# Patient Record
Sex: FEMALE | Race: WHITE
[De-identification: names, ages, dates, MRNs, and addresses within clinical notes are randomized per-mention and may not be internally consistent; named-entity substitution may affect disease eponyms.]

---
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
## 2025-01-28 NOTE — ASU PREOP CHECKLIST - HEIGHT IN FEET
[facility] [place] -- Progress Note    Subjective   NO complaints.   Current Facility-Administered Medications   Medication    pantoprazole (PROTONIX INJECT) injection 40 mg    ondansetron (ZOFRAN) injection 4 mg    morphine injection 4 mg    atorvastatin (LIPITOR) tablet 20 mg    cholecalciferol (VITAMIN D) 1.25 mg(50,000 units) capsule 1.25 mg    [Held by provider] hydrALAZINE (APRESOLINE) tablet 25 mg    levothyroxine (SYNTHROID, LEVOTHROID) tablet 88 mcg    [Held by provider] losartan (COZAAR) tablet 100 mg    sildenafil (REVATIO) tablet 20 mg    cefTRIAXone (ROCEPHIN) 2,000 mg in sterile water (preservative free) IV syringe    [Held by provider] furosemide (LASIX) tablet 40 mg    ALPRAZolam (XANAX) tablet 0.25 mg    metoPROLOL tartrate (LOPRESSOR) tablet 50 mg    acetaminophen (TYLENOL) tablet 650 mg     Current Outpatient Medications   Medication Sig Dispense Refill    levothyroxine 88 MCG tablet Take 88 mcg by mouth daily.      atorvastatin (LIPITOR) 20 MG tablet Take 20 mg by mouth daily.      sildenafil (REVATIO) 20 MG tablet Take 20 mg by mouth in the morning and 20 mg at noon and 20 mg in the evening.      hydrALAZINE (APRESOLINE) 25 MG tablet Take 25 mg by mouth in the morning and 25 mg at noon and 25 mg in the evening.      apixaBAN (ELIQUIS) 5 MG Tab Take 5 mg by mouth every 12 hours.      ZINC SULFATE PO Take 1 tablet by mouth daily.      ergocalciferol (DRISDOL) 1.25 mg (50,000 units) capsule Take 1.25 mg by mouth 1 day a week.      acetaminophen (TYLENOL) 500 MG tablet Take 500 mg by mouth every 6 hours as needed for Pain.      ferrous sulfate 325 (65 FE) MG tablet Take 325 mg by mouth daily (with breakfast).      furosemide (LASIX) 40 MG tablet Take 40 mg by mouth every other day.      losartan (COZAAR) 100 MG tablet Take 100 mg by mouth daily.      metoPROLOL tartrate (LOPRESSOR) 25 MG tablet Take 25 mg by mouth in the morning and 25 mg in the evening.      docusate sodium-sennosides  (SENOKOT S) 50-8.6 MG per tablet Take 1 tablet by mouth in the morning and 1 tablet in the evening.      ALPRAZolam (XANAX) 0.25 MG tablet TAKE ONE TABLET BY MOUTH IF NEEDED FOR HYPERBARIC THERAPY TO AVOID CALUSTROPHOBIA      metoPROLOL succinate (TOPROL-XL) 25 MG 24 hr tablet Take 25 mg by mouth in the morning and 25 mg in the evening.      alendronate (FOSAMAX) 70 MG tablet Take 70 mg by mouth 1 day a week.         Prior to Admission medications    Medication Sig Start Date End Date Taking? Authorizing Provider   levothyroxine 88 MCG tablet Take 88 mcg by mouth daily.   Yes Provider, Outside   atorvastatin (LIPITOR) 20 MG tablet Take 20 mg by mouth daily.   Yes Provider, Outside   sildenafil (REVATIO) 20 MG tablet Take 20 mg by mouth in the morning and 20 mg at noon and 20 mg in the evening.   Yes Provider, Outside   hydrALAZINE (APRESOLINE) 25 MG tablet Take 25 mg by mouth in the morning and 25 mg at noon and 25 mg in the evening.   Yes Provider, Outside   apixaBAN (ELIQUIS) 5 MG Tab Take 5 mg by mouth every 12 hours.   Yes Provider, Outside   ZINC SULFATE PO Take 1 tablet by mouth daily.   Yes Provider, Outside   ergocalciferol (DRISDOL) 1.25 mg (50,000 units) capsule Take 1.25 mg by mouth 1 day a week.   Yes Provider, Outside   acetaminophen (TYLENOL) 500 MG tablet Take 500 mg by mouth every 6 hours as needed for Pain.   Yes Provider, Outside   ferrous sulfate 325 (65 FE) MG tablet Take 325 mg by mouth daily (with breakfast).   Yes Provider, Outside   furosemide (LASIX) 40 MG tablet Take 40 mg by mouth every other day.   Yes Provider, Outside   losartan (COZAAR) 100 MG tablet Take 100 mg by mouth daily.   Yes Provider, Outside   metoPROLOL tartrate (LOPRESSOR) 25 MG tablet Take 25 mg by mouth in the morning and 25 mg in the evening.   Yes Provider, Outside   docusate sodium-sennosides (SENOKOT S) 50-8.6 MG per tablet Take 1 tablet by mouth in the morning and 1 tablet in the evening.   Yes Provider, Outside    ALPRAZolam (XANAX) 0.25 MG tablet TAKE ONE TABLET BY MOUTH IF NEEDED FOR HYPERBARIC THERAPY TO AVOID CALUSTROPHOBIA 8/17/24  Yes Provider, Outside   metoPROLOL succinate (TOPROL-XL) 25 MG 24 hr tablet Take 25 mg by mouth in the morning and 25 mg in the evening.   Yes Provider, Outside   alendronate (FOSAMAX) 70 MG tablet Take 70 mg by mouth 1 day a week. 10/5/24  Yes Provider, Outside       Objective   Temp:  [97.9 °F (36.6 °C)-98.6 °F (37 °C)] 97.9 °F (36.6 °C)  Heart Rate:  [] 100  Resp:  [15-26] 20  BP: (104-133)/(44-73) 122/58  FiO2 (%):  [40 %] 40 %     Telemetry:a fib  General: Alert and oriented in no apparent distress.  HEENT: No focal deficits.  Neck: No JVD   Cardiac: Irregular rhythm, no murmur  Lungs: Clear to ausculation, no crackles   Abdomen: tender due to chronic epigastric. wound.   Extremities:  pedal pulses 0/4,  no edema.  Tender to palpation.   Neurologic: Alert and oriented, normal affect.  Skin: Warm and dry.   Psych: cooperative    Labs:  Cardiac Labs  No results found    Recent Results (from the past 24 hour(s))   Comprehensive Metabolic Panel    Collection Time: 01/27/25  8:41 AM    Specimen: Blood, Venous   Result Value Ref Range    Fasting Status      Sodium 140 135 - 145 mmol/L    Potassium 4.1 3.4 - 5.1 mmol/L    Chloride 109 97 - 110 mmol/L    Carbon Dioxide 27 21 - 32 mmol/L    Anion Gap 8 7 - 19 mmol/L    Glucose 83 70 - 99 mg/dL    BUN 24 (H) 6 - 20 mg/dL    Creatinine 0.84 0.51 - 0.95 mg/dL    Glomerular Filtration Rate 70 >=60    BUN/Cr 29 (H) 7 - 25    Calcium 8.5 8.4 - 10.2 mg/dL    Bilirubin, Total 1.2 (H) 0.2 - 1.0 mg/dL    GOT/AST 82 (H) <=37 Units/L    GPT/ (H) <64 Units/L    Alkaline Phosphatase 195 (H) 45 - 117 Units/L    Albumin 2.4 (L) 3.4 - 5.0 g/dL    Protein, Total 6.4 6.4 - 8.2 g/dL    Globulin 4.0 2.0 - 4.0 g/dL    A/G Ratio 0.6 (L) 1.0 - 2.4   CBC with Automated Differential (performable only)    Collection Time: 01/27/25  8:41 AM    Specimen: Blood,  Venous   Result Value Ref Range    WBC 9.3 4.2 - 11.0 K/mcL    RBC 2.51 (L) 4.00 - 5.20 mil/mcL    HGB 8.1 (L) 12.0 - 15.5 g/dL    HCT 26.6 (L) 36.0 - 46.5 %    .0 (H) 78.0 - 100.0 fl    MCH 32.3 26.0 - 34.0 pg    MCHC 30.5 (L) 32.0 - 36.5 g/dL    RDW-CV 14.5 11.0 - 15.0 %    RDW-SD 56.2 (H) 39.0 - 50.0 fL     140 - 450 K/mcL    NRBC 0 <=0 /100 WBC    Neutrophil, Percent 72 %    Lymphocytes, Percent 11 %    Mono, Percent 16 %    Eosinophils, Percent 0 %    Basophils, Percent 0 %    Immature Granulocytes 1 %    Absolute Neutrophils 6.7 1.8 - 7.7 K/mcL    Absolute Lymphocytes 1.0 1.0 - 4.0 K/mcL    Absolute Monocytes 1.5 (H) 0.3 - 0.9 K/mcL    Absolute Eosinophils  0.0 0.0 - 0.5 K/mcL    Absolute Basophils 0.0 0.0 - 0.3 K/mcL    Absolute Immature Granulocytes 0.1 0.0 - 0.2 K/mcL   GLUCOSE, BEDSIDE - POINT OF CARE    Collection Time: 01/27/25  8:50 AM    Specimen: Blood   Result Value Ref Range    GLUCOSE, BEDSIDE - POINT OF CARE 72 70 - 99 mg/dL   GLUCOSE, BEDSIDE - POINT OF CARE    Collection Time: 01/27/25 11:34 AM    Specimen: Blood   Result Value Ref Range    GLUCOSE, BEDSIDE - POINT OF CARE 73 70 - 99 mg/dL   TRANSTHORACIC ECHO (TTE) COMPLETE W/ W/O IMAGING AGENT    Collection Time: 01/27/25  2:22 PM   Result Value Ref Range    Ejection Fraction 58%     AV VTI (Previously displayed as [place]) 1.29     MV Peak E Velocity 0.38     MV Peak A Velocity 235     E Wave Decelaration Time [id number]     MV E Wave Galindo/E Tissue Galindo Med 8.49     MV E Tissue Galindo Med 10.8     Tricuspid Valve Peak Regurgitation Velocity 3     Ascending Aorta 8     TV Estimated Right Arterial Pressure 6.96     Tricuspid valve annular peak velocity 46     [place]USTEN LVOT Peak Gradient 1.3     LV end diastolic posterior wall thickness 2D 4.9     Left Ventricular Internal Dimension in Diastole 3.3     Left Internal Dimenson in Systole 1.3     Interventricular Septum in End Diastole 1.37    GLUCOSE, BEDSIDE - POINT OF CARE    Collection Time:  01/27/25  6:02 PM    Specimen: Blood   Result Value Ref Range    GLUCOSE, BEDSIDE - POINT OF CARE 92 70 - 99 mg/dL   Lactic Acid, Venous    Collection Time: 01/27/25 10:19 PM    Specimen: Blood, Venous   Result Value Ref Range    Lactate, Venous 1.1 0.0 - 2.0 mmol/L       Imaging:  MRI ABDOMEN W WO CONTRAST   Preliminary Result         Electronically signed by Dougie Hu MD on 01 27 25 at 03:29      US LIVER GALLBLADDER PANCREAS (RUQ)   Final Result   Complex two large right lobe hepatic masses. Clinical correlation is   advised. Also correlate with CT abdomen pelvis performed the same day.      Hepatomegaly with steatosis.      Intra-abdominal ascites.      Gallbladder wall thickening. No intrahepatic biliary ductal dilatation.   Could not assess extrahepatic biliary ducts or Gibbs sign.      Nonvisualization of the pancreas due to overlying bowel gas.      Right pleural effusion.      Thank you for the opportunity to participate in the care of your patient.   Please contact us if we can be of further assistance.            Electronically Signed by: JOSE YU DO    Signed on: 1/26/2025 2:20 PM    Workstation ID: [id number]      CT ABDOMEN PELVIS W CONTRAST   Final Result   1.   16.8 cm lentiform masslike area along the subcapsular right hepatic   lobe is complex and heterogeneous. This may represent a subcapsular   hematoma, mass including metastatic disease or hepatocellular carcinoma,   less likely infarct. Additional areas of abnormal liver attenuation   extending inferiorly may represent parenchymal dissection of the potential   subcapsular hematoma or extension of the mass. MRI abdomen is advised with   without contrast for further assessment.   2.   Round masslike area in liver segment 8 measuring 3.8 cm concerning for   metastatic disease or hepatocellular carcinoma. 5 cm additional area of   subcapsular abnormal density along the inferomedial liver may also   represent subcapsular hematoma  or mass.    3.   Slightly nodular liver contour suspicious for cirrhosis.   4.   2.3 cm left adrenal gland nodule is indeterminate, increased in size   from 2018.   5.   Two small hypoattenuating lesions in the spleen are likely small cysts   or hemangiomas, but nonspecific.   6.   Small to moderate volume of ascites.   7.   Small to moderate pericardial effusion. Moderate coronary artery   calcifications.   8.   Moderate right pleural effusion with partial compressive atelectasis   of the right lower lobe.   9.   Mastectomy and postsurgical changes along the right anterior abdomen   discussed above.      Findings were discussed with Dr. Sveta Crawley at 2:00 p.m. on 1/26/2025.      Electronically Signed by: SREE FUENTES MD    Signed on: 1/26/2025 2:17 PM    Workstation ID: [id number]      XR CHEST AP OR PA   Final Result   Moderate cardiomegaly with small right pleural effusion.         Electronically Signed by: JOSE YU DO    Signed on: 1/26/2025 1:27 PM    Workstation ID: [id number]             Assessment  Principal Problem:    Subcapsular hepatic hematoma  Active Problems:    Acute abdominal pain     Pericardial effusion:  small on echo.  Hemodynamically stable  History of a pericardial window 10 years ago      2. Afib  Eliquis held  Rate controlled      3. Breast CA with possible mets  Radical mastectomy 7/24, persistent wound/ ulcer.   MRI:  Possible subcapsular mass, metastatic cs hepatocellular CA     4. HTN  Stable     5.  Pt had some L parasternal chest pain on admission.   Troponin normal  Given co morbidities, I would opt for conservative approach    6.  Macrocytic anemia.     Plan:  Given co morbidities, would hold eliquis, and take conservative approach to chest pain prior to admission.  F/u with Dr Jose R Millan following discharge      Ja Huggins MD  Interventional Cardiology   5

## (undated) DEVICE — GOWN ROYAL SILK XL

## (undated) DEVICE — SUT VICRYL 2-0 27" CT-1 UNDYED

## (undated) DEVICE — SUT QUILL MONODERM 2-0 30CM 24MM

## (undated) DEVICE — SUT MONOCRYL 4-0 27" PS-2 UNDYED

## (undated) DEVICE — SUT MONOCRYL 3-0 18" PS-1

## (undated) DEVICE — GLV 7.5 PROTEXIS (WHITE)

## (undated) DEVICE — SLV COMPRESSION KNEE MED

## (undated) DEVICE — TUBING SUCTION NONCONDUCTIVE 6MM X 12FT

## (undated) DEVICE — DRSG COMBINE 5X9"

## (undated) DEVICE — NDL SPINAL 18G X 3.5" (PINK)

## (undated) DEVICE — WARMING BLANKET FULL UNDERBODY ADULT

## (undated) DEVICE — SYR LUER LOK 50CC

## (undated) DEVICE — ELCTR STRYKER NEPTUNE BLADE COATED, INSULATED 70MM

## (undated) DEVICE — WARMING BLANKET LOWER ADULT

## (undated) DEVICE — ELCTR PENCIL SMOKE EVACUATOR COATED PUSH BUTTON 70MM

## (undated) DEVICE — PACK BREAST RECONSTRUCTION

## (undated) DEVICE — DRSG GAUZE MOISTURIZER 0.5 OZ 4X8